# Patient Record
Sex: MALE | Race: WHITE | Employment: FULL TIME | URBAN - METROPOLITAN AREA
[De-identification: names, ages, dates, MRNs, and addresses within clinical notes are randomized per-mention and may not be internally consistent; named-entity substitution may affect disease eponyms.]

---

## 2014-10-09 LAB
ALBUMIN SERPL-MCNC: NORMAL G/DL
ALP BLD-CCNC: NORMAL U/L
ALT SERPL-CCNC: NORMAL U/L
ANION GAP SERPL CALCULATED.3IONS-SCNC: NORMAL MMOL/L
AST SERPL-CCNC: NORMAL U/L
BILIRUB SERPL-MCNC: NORMAL MG/DL
BUN BLDV-MCNC: NORMAL MG/DL
CALCIUM SERPL-MCNC: NORMAL MG/DL
CHLORIDE BLD-SCNC: NORMAL MMOL/L
CHOLESTEROL, TOTAL: NORMAL
CHOLESTEROL/HDL RATIO: NORMAL
CO2: NORMAL
CREAT SERPL-MCNC: NORMAL MG/DL
GFR CALCULATED: NORMAL
GLUCOSE BLD-MCNC: NORMAL MG/DL
HDLC SERPL-MCNC: NORMAL MG/DL
LDL CHOLESTEROL CALCULATED: NORMAL
POTASSIUM SERPL-SCNC: NORMAL MMOL/L
SODIUM BLD-SCNC: NORMAL MMOL/L
TOTAL PROTEIN: NORMAL
TRIGL SERPL-MCNC: NORMAL MG/DL
VLDLC SERPL CALC-MCNC: NORMAL MG/DL

## 2019-12-19 RX ORDER — ESCITALOPRAM OXALATE 5 MG/1
5 TABLET ORAL DAILY
COMMUNITY

## 2020-01-22 ENCOUNTER — HOSPITAL ENCOUNTER (EMERGENCY)
Age: 30
Discharge: HOME | End: 2020-01-22
Attending: EMERGENCY MEDICINE
Payer: COMMERCIAL

## 2020-01-22 VITALS
WEIGHT: 160 LBS | DIASTOLIC BLOOD PRESSURE: 92 MMHG | TEMPERATURE: 98.3 F | OXYGEN SATURATION: 98 % | HEIGHT: 72 IN | RESPIRATION RATE: 18 BRPM | HEART RATE: 96 BPM | SYSTOLIC BLOOD PRESSURE: 142 MMHG | BODY MASS INDEX: 21.67 KG/M2

## 2020-01-22 PROCEDURE — 99282 EMERGENCY DEPT VISIT SF MDM: CPT

## 2020-01-22 RX ORDER — DEXTROAMPHETAMINE SACCHARATE, AMPHETAMINE ASPARTATE, DEXTROAMPHETAMINE SULFATE AND AMPHETAMINE SULFATE 7.5; 7.5; 7.5; 7.5 MG/1; MG/1; MG/1; MG/1
30 TABLET ORAL 2 TIMES DAILY
COMMUNITY

## 2020-01-22 ASSESSMENT — PAIN DESCRIPTION - PAIN TYPE: TYPE: ACUTE PAIN

## 2020-01-22 ASSESSMENT — PAIN DESCRIPTION - ONSET: ONSET: SUDDEN

## 2020-01-22 ASSESSMENT — PAIN DESCRIPTION - DESCRIPTORS: DESCRIPTORS: DISCOMFORT;TINGLING

## 2020-01-22 ASSESSMENT — PAIN DESCRIPTION - LOCATION: LOCATION: LEG;HAND;FOOT

## 2020-01-22 ASSESSMENT — PAIN DESCRIPTION - PROGRESSION: CLINICAL_PROGRESSION: NOT CHANGED

## 2020-01-22 ASSESSMENT — PAIN SCALES - GENERAL: PAINLEVEL_OUTOF10: 5

## 2020-01-22 ASSESSMENT — PAIN DESCRIPTION - FREQUENCY: FREQUENCY: CONTINUOUS

## 2020-01-23 NOTE — ED PROVIDER NOTES
encounter and vital signs as below have been reviewed by myself. BP (!) 150/100   Pulse 83   Temp 97.6 °F (36.4 °C) (Oral)   Resp 12   Ht 6' (1.829 m)   Wt 160 lb (72.6 kg)   SpO2 97%   BMI 21.70 kg/m²   Oxygen Saturation Interpretation: Normal    The patients available past medical records and past encounters were reviewed. ------------------------------ ED COURSE/MEDICAL DECISION MAKING----------------------  Medications - No data to display    Medical Decision Making:    Patient comes to the ED with a vibrating sensation to his right thigh and intermittent tingling to his toes and fingers. Pt thinks it is a muscle spasm but wanted to be checked out [place]. Was advised to have blood work but declines. Emergency provider has shared the specific conditions for return, as well as the importance of follow-up with PCP. Counseling: The emergency provider has spoken with the patient and discussed todays results, in addition to providing specific details for the plan of care and counseling regarding the diagnosis and prognosis. Questions are answered at this time and they are agreeable with the plan.       --------------------------------- IMPRESSION AND DISPOSITION ---------------------------------    IMPRESSION  1. Paresthesia of right lower extremity    2. Hypertension, unspecified type        DISPOSITION  Disposition: Discharge to home  Patient condition is stable    SCRIBE ATTESTATION  1/22/20, 7:16 PM.    This note is prepared by Allegra Dunn acting as Scribe for Jose Gómez MD.    Jose Gómez MD:  The scribe's documentation has been prepared under my direction and personally reviewed by me in its entirety. I confirm that the note above accurately reflects all work, treatment, procedures, and medical decision making performed by me.              Jose Gómez MD  01/23/20 0000